# Patient Record
Sex: MALE | Race: WHITE | NOT HISPANIC OR LATINO
[De-identification: names, ages, dates, MRNs, and addresses within clinical notes are randomized per-mention and may not be internally consistent; named-entity substitution may affect disease eponyms.]

---

## 2020-12-29 ENCOUNTER — TRANSCRIPTION ENCOUNTER (OUTPATIENT)
Age: 50
End: 2020-12-29

## 2022-04-13 ENCOUNTER — OUTPATIENT (OUTPATIENT)
Dept: OUTPATIENT SERVICES | Facility: HOSPITAL | Age: 52
LOS: 1 days | Discharge: HOME | End: 2022-04-13
Payer: MEDICARE

## 2022-04-13 ENCOUNTER — APPOINTMENT (OUTPATIENT)
Dept: WOUND CARE | Facility: HOSPITAL | Age: 52
End: 2022-04-13
Payer: MEDICARE

## 2022-04-13 VITALS
TEMPERATURE: 97.7 F | SYSTOLIC BLOOD PRESSURE: 195 MMHG | RESPIRATION RATE: 20 BRPM | OXYGEN SATURATION: 95 % | DIASTOLIC BLOOD PRESSURE: 104 MMHG | WEIGHT: 200 LBS | HEIGHT: 61 IN | BODY MASS INDEX: 37.76 KG/M2 | HEART RATE: 98 BPM

## 2022-04-13 DIAGNOSIS — Z98.890 OTHER SPECIFIED POSTPROCEDURAL STATES: ICD-10-CM

## 2022-04-13 DIAGNOSIS — E78.5 HYPERLIPIDEMIA, UNSPECIFIED: ICD-10-CM

## 2022-04-13 DIAGNOSIS — I10 ESSENTIAL (PRIMARY) HYPERTENSION: ICD-10-CM

## 2022-04-13 DIAGNOSIS — L89.150 PRESSURE ULCER OF SACRAL REGION, UNSTAGEABLE: ICD-10-CM

## 2022-04-13 DIAGNOSIS — R73.03 PREDIABETES.: ICD-10-CM

## 2022-04-13 DIAGNOSIS — R32 UNSPECIFIED URINARY INCONTINENCE: ICD-10-CM

## 2022-04-13 PROCEDURE — 99203 OFFICE O/P NEW LOW 30 MIN: CPT

## 2022-04-13 PROCEDURE — G0463: CPT

## 2022-04-13 RX ORDER — METFORMIN HYDROCHLORIDE 625 MG/1
TABLET ORAL
Refills: 0 | Status: ACTIVE | COMMUNITY

## 2022-04-13 RX ORDER — HYDROCHLOROTHIAZIDE 12.5 MG/1
TABLET ORAL
Refills: 0 | Status: ACTIVE | COMMUNITY

## 2022-04-13 RX ORDER — ROSUVASTATIN CALCIUM 5 MG/1
TABLET, FILM COATED ORAL
Refills: 0 | Status: ACTIVE | COMMUNITY

## 2022-04-13 RX ORDER — AMLODIPINE BESYLATE 5 MG/1
TABLET ORAL
Refills: 0 | Status: ACTIVE | COMMUNITY

## 2022-04-13 RX ORDER — LOSARTAN POTASSIUM 100 MG/1
TABLET, FILM COATED ORAL
Refills: 0 | Status: ACTIVE | COMMUNITY

## 2022-04-14 ENCOUNTER — NON-APPOINTMENT (OUTPATIENT)
Age: 52
End: 2022-04-14

## 2022-04-14 DIAGNOSIS — Z79.899 OTHER LONG TERM (CURRENT) DRUG THERAPY: ICD-10-CM

## 2022-04-14 DIAGNOSIS — N31.9 NEUROMUSCULAR DYSFUNCTION OF BLADDER, UNSPECIFIED: ICD-10-CM

## 2022-04-14 DIAGNOSIS — N36.8 OTHER SPECIFIED DISORDERS OF URETHRA: ICD-10-CM

## 2022-04-14 DIAGNOSIS — Z91.041 RADIOGRAPHIC DYE ALLERGY STATUS: ICD-10-CM

## 2022-04-14 DIAGNOSIS — L89.323 PRESSURE ULCER OF LEFT BUTTOCK, STAGE 3: ICD-10-CM

## 2022-04-14 DIAGNOSIS — Q05.9 SPINA BIFIDA, UNSPECIFIED: ICD-10-CM

## 2022-04-14 DIAGNOSIS — Z79.84 LONG TERM (CURRENT) USE OF ORAL HYPOGLYCEMIC DRUGS: ICD-10-CM

## 2022-04-14 DIAGNOSIS — R73.03 PREDIABETES: ICD-10-CM

## 2022-04-15 NOTE — HISTORY OF PRESENT ILLNESS
[FreeTextEntry1] : 52 yo WM, here as a new patient/evaluation. Pt with spina bifida. Here for a 2nd opinion for tx of his left ischial pressure ulcer. Pt states he is presently being tx at the [facility] and that the dr. tong recommended surgical excision of the press. wound and then suturing it closed. Pt also states the ulcer is getting smaller. Pt is able to stand/ambulate with the assistance of crutches, but does sit for 2hrs each day to work. I emphasized the importance of standing/ambulation as much as possible in the day/night to help in the healing of his pressure ulcer and to prevent recurrence. Since it is looking well with granul tissue and getting smaller accoridng to the pt, I advised continued conservative rx. I also informed pt that plastic surgical closure with grafts is always an option. Time given for Q&A.\par

## 2022-04-15 NOTE — PHYSICAL EXAM
[Normal Thyroid] : the thyroid was normal [Normal Breath Sounds] : Normal breath sounds [Normal Rate and Rhythm] : normal rate and rhythm [Alert] : alert [Oriented to Person] : oriented to person [Oriented to Place] : oriented to place [Oriented to Time] : oriented to time [Calm] : calm [4 x 4] : 4 x 4  [Abdominal Pad] : Abdominal Pad [JVD] : no jugular venous distention  [Abdomen Masses] : No abdominal massess [Abdomen Tenderness] : ~T ~M No abdominal tenderness [Tender] : nontender [Enlarged] : not enlarged [de-identified] : adult WM, NAD, alert, Ox3. [FreeTextEntry1] : Left ischium  [FreeTextEntry2] : 2.6 [FreeTextEntry4] : 0.1 [FreeTextEntry3] : 2 [de-identified] : Serous/sanguinous [de-identified] : Gerda then calcium alginate  [de-identified] : Mechanically cleansed with sterile gauze and normal saline.\par Cloth tape  [TWNoteComboBox2] : 3 [TWNoteComboBox3] : FT [TWNoteComboBox4] : Small [de-identified] : Normal [de-identified] : Mild [de-identified] : None [de-identified] : 100% [de-identified] : No [de-identified] : 3x Weekly [de-identified] : Primary Dressing

## 2022-04-15 NOTE — ASSESSMENT
[Verbal] : Verbal [Written] : Written [Demo] : Demo [Patient] : Patient [Good - alert, interested, motivated] : Good - alert, interested, motivated [Verbalizes knowledge/Understanding] : Verbalizes knowledge/understanding [Dressing changes] : dressing changes [Skin Care] : skin care [Pressure relief] : pressure relief [Signs and symptoms of infection] : sign and symptoms of infection [Nutrition] : nutrition [How and When to Call] : how and when to call [Pain Management] : pain management [Patient responsibility to plan of care] : patient responsibility to plan of care [] : Yes [Stable] : stable [Home] : Home [Crutches] : Crutches [Faxed - Long Term Care/Home Health Agency] : Long Term Care/Home Health Agency: Faxed [FreeTextEntry2] : Infection prevention\par Localized wound care \par Goal remaining pain free regarding wounds\par Promote optimal nutrition.  [FreeTextEntry4] : Patient educated on the reasons pressure injuries occur and ways to prevent. Educated on the complications in result of pressure injuries such as pain and infection.  Educated on turning and repositioning at least every hour as well as ambulating if applicable. Patient educated on the utilization of localized wound care and any medical equipment that manage/prevent pressure injuries.  Educated on the importance of nutrition and to consume a good amount of protein in diet. Patient showed understanding.\par \par Patient is not currently using any offloading mattress. Patient sleeps on regular mattress. He turns and reposition himself freely. \par group 1 mattress submitted \par Nutrition consult submitted \par Pt told to bring in med list at next follow up \par Follow up in 2 weeks  [FreeTextEntry1] : [facility] prakash EVANS

## 2022-04-27 ENCOUNTER — APPOINTMENT (OUTPATIENT)
Dept: WOUND CARE | Facility: HOSPITAL | Age: 52
End: 2022-04-27

## 2023-03-17 ENCOUNTER — APPOINTMENT (OUTPATIENT)
Dept: WOUND CARE | Facility: HOSPITAL | Age: 53
End: 2023-03-17
Payer: MEDICARE

## 2023-03-17 ENCOUNTER — OUTPATIENT (OUTPATIENT)
Dept: OUTPATIENT SERVICES | Facility: HOSPITAL | Age: 53
LOS: 1 days | Discharge: HOME | End: 2023-03-17
Payer: MEDICARE

## 2023-03-17 VITALS
DIASTOLIC BLOOD PRESSURE: 99 MMHG | WEIGHT: 196 LBS | OXYGEN SATURATION: 97 % | SYSTOLIC BLOOD PRESSURE: 161 MMHG | HEIGHT: 61 IN | BODY MASS INDEX: 37 KG/M2 | HEART RATE: 80 BPM | TEMPERATURE: 98.2 F | RESPIRATION RATE: 20 BRPM

## 2023-03-17 DIAGNOSIS — Z80.9 FAMILY HISTORY OF MALIGNANT NEOPLASM, UNSPECIFIED: ICD-10-CM

## 2023-03-17 DIAGNOSIS — L89.150 PRESSURE ULCER OF SACRAL REGION, UNSTAGEABLE: ICD-10-CM

## 2023-03-17 DIAGNOSIS — L89.323 PRESSURE ULCER OF LEFT BUTTOCK, STAGE 3: ICD-10-CM

## 2023-03-17 PROCEDURE — G0463: CPT

## 2023-03-17 PROCEDURE — 99215 OFFICE O/P EST HI 40 MIN: CPT

## 2023-03-19 DIAGNOSIS — N31.9 NEUROMUSCULAR DYSFUNCTION OF BLADDER, UNSPECIFIED: ICD-10-CM

## 2023-03-19 DIAGNOSIS — L89.323 PRESSURE ULCER OF LEFT BUTTOCK, STAGE 3: ICD-10-CM

## 2023-03-19 DIAGNOSIS — R73.03 PREDIABETES: ICD-10-CM

## 2023-03-19 DIAGNOSIS — Z91.041 RADIOGRAPHIC DYE ALLERGY STATUS: ICD-10-CM

## 2023-03-19 DIAGNOSIS — Z79.899 OTHER LONG TERM (CURRENT) DRUG THERAPY: ICD-10-CM

## 2023-03-19 DIAGNOSIS — Z79.84 LONG TERM (CURRENT) USE OF ORAL HYPOGLYCEMIC DRUGS: ICD-10-CM

## 2023-03-19 DIAGNOSIS — Q05.9 SPINA BIFIDA, UNSPECIFIED: ICD-10-CM

## 2023-03-19 DIAGNOSIS — N36.8 OTHER SPECIFIED DISORDERS OF URETHRA: ICD-10-CM

## 2023-03-23 NOTE — ASSESSMENT
[Verbal] : Verbal [Written] : Written [Demo] : Demo [Patient] : Patient [Good - alert, interested, motivated] : Good - alert, interested, motivated [Verbalizes knowledge/Understanding] : Verbalizes knowledge/understanding [Dressing changes] : dressing changes [Skin Care] : skin care [Pressure relief] : pressure relief [Signs and symptoms of infection] : sign and symptoms of infection [Nutrition] : nutrition [How and When to Call] : how and when to call [Labs and Tests] : labs and tests [Pain Management] : pain management [Patient responsibility to plan of care] : patient responsibility to plan of care [Glycemic Control] : glycemic control [] : Yes [Stable] : stable [Home] : Home [Wheelchair] : Wheelchair [Faxed - Long Term Care/Home Health Agency] : Long Term Care/Home Health Agency: Faxed [FreeTextEntry2] : Infection Prevention\par Glycemic Control\par Wound healing and Nutrition\par Pressure Relief \par Promote Skin integrity\par Protect Wound Site\par  [FreeTextEntry3] : Initial assessment [FreeTextEntry4] : PT had MRI done on 3/16/23 at [facility] Pt had MRI report MD discussed MRI results with PT \par No changes made to wound care orders\par MD discussed pt following up with a surgeon to address a potential abscess\par MD discussed ordering Pressure relieving equipment for the pt, PT refused MD placing an order for ROHO cushion and Air mattress.\par Pt to follow up with [facility]/ DR Vila PT verbalized understanding.\par  [FreeTextEntry1] : YURI

## 2023-03-23 NOTE — REVIEW OF SYSTEMS
[Negative] : Heme/Lymph [FreeTextEntry2] : obese male with decreased ambulation, spina bifida [FreeTextEntry5] : hyperlipidemia,hypertension, [FreeTextEntry8] : neurogenic bladder [de-identified] : left ischial ulcer vs wound [de-identified] : Pre DM

## 2023-03-23 NOTE — HISTORY OF PRESENT ILLNESS
[FreeTextEntry1] : Pt has had a wound on his left ischium since 2005. Dr Vila has been treating the wound with different products. Pt admitted to [place] in October 2022 for 10 days due to an infection of the wound. PT went to rehab for 3 months and wound still not showing improvement. PT has VNS coming to the home every day for wound care treating wound with 1/4th Dakins, silver Alginate and dry dressing.Pt had an MRI done on 3/16/23 at [facility]. PT came to [facility] for a second opinion. Patient accompanied by self. 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 The ulcer when seen By Dr Gimenez in April was superficial. Patient has noted increased drainage. Patient was sent for MRI of the area that was done yesterday. No osteomyelitis was noted but report read that he was at risk for it. A cellulitis was noted in the deep tissues and an fluid collection noted that could represent an abscess. \par

## 2023-03-23 NOTE — PHYSICAL EXAM
[4 x 4] : 4 x 4  [Abdominal Pad] : Abdominal Pad [Normal Thyroid] : the thyroid was normal [Normal Breath Sounds] : Normal breath sounds [Normal Rate and Rhythm] : normal rate and rhythm [Alert] : alert [Oriented to Person] : oriented to person [Oriented to Place] : oriented to place [Oriented to Time] : oriented to time [Calm] : calm [JVD] : no jugular venous distention  [Abdomen Masses] : No abdominal massess [Abdomen Tenderness] : ~T ~M No abdominal tenderness [Tender] : nontender [Enlarged] : not enlarged [de-identified] : adult WM, NAD, alert, Ox3. [de-identified] : left ischial wound/ulcer probes to near bone but no palpable bone or collections [FreeTextEntry1] : Left Ischium [FreeTextEntry2] : 2.3 [FreeTextEntry4] : 8.5 [FreeTextEntry3] : 6.0 [de-identified] : 8.5 at 10-11 oclock [de-identified] : 75% [de-identified] : 25% [de-identified] : Silver Alginate [de-identified] : Mechanical Cleanse with 1/4th strength Dakin's solution and sterile gauze [TWNoteComboBox5] : Yes [TWNoteComboBox6] : Pressure [de-identified] : Macerated [de-identified] : None [de-identified] : None [de-identified] : Daily [de-identified] : Primary Dressing

## 2023-03-23 NOTE — PLAN
[FreeTextEntry1] : patient presented for a second opinion about a ischial ulcer\par patient packing wound with AgRope daily and recommend he continue. \par Recommended he return to Dr Vila to discuss the MRI results and possible need for IV antibiotics and exploration of the fluid collection, The cellulitis wound account for increased drainage. \par Patient is going to Contact Dr. Vila.\par Patient may return if he desires\par 45 minutes spent in review,evaluation and potential treatment discussion included him also seeing a plastic surgeon for possible future wound closure

## 2025-06-17 ENCOUNTER — NON-APPOINTMENT (OUTPATIENT)
Age: 55
End: 2025-06-17